# Patient Record
Sex: FEMALE | HISPANIC OR LATINO | Employment: FULL TIME | URBAN - METROPOLITAN AREA
[De-identification: names, ages, dates, MRNs, and addresses within clinical notes are randomized per-mention and may not be internally consistent; named-entity substitution may affect disease eponyms.]

---

## 2017-01-26 ENCOUNTER — TELEPHONE (OUTPATIENT)
Dept: OBSTETRICS AND GYNECOLOGY | Facility: CLINIC | Age: 22
End: 2017-01-26

## 2017-01-26 NOTE — TELEPHONE ENCOUNTER
Advised the patient that she needs to avoid any stimulation of the nipple or breast.  She should also wear a tight fitting bra or sports bra.  She may use cold packs or cold compresses to the breast as well.  She may also try over-the-counter Sudafed twice a day.    ----- Message from Deann Bernal sent at 1/26/2017  1:26 PM EST -----  Contact: PATIENT  Pt called for recommendations on how to dry up her breast milk.  Baby is 1 year old and is now on regular milk.    Pt # [phone]

## 2017-04-07 ENCOUNTER — OFFICE VISIT (OUTPATIENT)
Dept: OBSTETRICS AND GYNECOLOGY | Facility: CLINIC | Age: 22
End: 2017-04-07

## 2017-04-07 VITALS
HEIGHT: 63 IN | SYSTOLIC BLOOD PRESSURE: 108 MMHG | WEIGHT: 193 LBS | DIASTOLIC BLOOD PRESSURE: 68 MMHG | BODY MASS INDEX: 34.2 KG/M2 | HEART RATE: 88 BPM

## 2017-04-07 DIAGNOSIS — Z30.016 ENCOUNTER FOR INITIAL PRESCRIPTION OF TRANSDERMAL PATCH HORMONAL CONTRACEPTIVE DEVICE: ICD-10-CM

## 2017-04-07 DIAGNOSIS — Z30.9 ENCOUNTER FOR CONTRACEPTIVE MANAGEMENT, UNSPECIFIED CONTRACEPTIVE ENCOUNTER TYPE: Primary | ICD-10-CM

## 2017-04-07 DIAGNOSIS — Z30.09 BIRTH CONTROL COUNSELING: ICD-10-CM

## 2017-04-07 LAB
B-HCG UR QL: NEGATIVE
INTERNAL NEGATIVE CONTROL: NEGATIVE
INTERNAL POSITIVE CONTROL: POSITIVE
Lab: NORMAL

## 2017-04-07 PROCEDURE — 81025 URINE PREGNANCY TEST: CPT | Performed by: OBSTETRICS & GYNECOLOGY

## 2017-04-07 PROCEDURE — 99214 OFFICE O/P EST MOD 30 MIN: CPT | Performed by: OBSTETRICS & GYNECOLOGY

## 2017-04-07 NOTE — PROGRESS NOTES
"Subjective   Ashley Pratt is a 21 y.o. female   CC: Wants to discuss birth control  History of Present Illness  Patient is here to discuss starting on birth control.  She is currently using condoms as her method of contraception.  Additionally, she occasionally has taken a Plan B over-the-counter pill.  She is sexually active with 1 partner.  They use condoms on most every time.  She reports that typically her periods are regular.  Her most recent menses began 4 days ago.  She reports that it was lighter than usual.  It is only lasted 3 days.  Typically her periods last 3-7 days.  She does note moderate to severe cramping during her menses.  Her flow is moderate.  She has never been on birth control in the past.  She declines STD testing today.  She did take a home urine pregnancy test which was negative.  The patient is concerned about the risks of weight gain with birth control.  She is also worried about side effects of possible acne with certain types of birth control.  Patient also does not think that she will remember to take a pill every day.    The following portions of the patient's history were reviewed and updated as appropriate: allergies, current medications, past family history, past medical history, past social history, past surgical history and problem list.    Review of Systems  General: No fever or chills  Constitutional: No weight loss or gain, no hair loss  HENT: No headache, no hearing loss, no tinnitus  Eyes: normal vision, no eye pain  Lungs: No cough, no shortness of breath  Heart: No chest pain, no palpitations  Abdomen: No nausea, vomiting, constipation or diarrhea  : No dysuria, no hematuria  Skin: No rashes  Lymph: No swelling  Neuro: No parathesia, no weakness  Psych: Normal though content, no hallucinations, no SI/HI    Objective   Physical Exam  Vitals:    04/07/17 0833   BP: 108/68   Pulse: 88   Weight: 193 lb (87.5 kg)   Height: 63\" (160 cm)   LMP: 4/3/17  Gen: No acute distress, " awake and oriented times three  Pelvic:  Deferred  Psych: Good judgement and insight, normal affect and mood    Labs: Office UPT neg    Assessment/Plan   Diagnoses and all orders for this visit:    Encounter for contraceptive management, unspecified contraceptive encounter type  -     POC Pregnancy, Urine  -     norelgestromin-ethinyl estradiol (ORTHO EVRA) 150-35 MCG/24HR; Place 1 patch on the skin 1 (One) Time Per Week.    Birth control counseling  -     norelgestromin-ethinyl estradiol (ORTHO EVRA) 150-35 MCG/24HR; Place 1 patch on the skin 1 (One) Time Per Week.    Encounter for initial prescription of transdermal patch hormonal contraceptive device    We have discussed the full gambit of contraceptive options at length today.  We discussed abstinence, natural family planning, condoms, Plan B, birth control pills, patches, NuvaRing, injectable birth control, long-acting reversible contraception, and nonhormonal IUDs.  The risks, benefits, alternatives, and side effects of all these methods were discussed at length.  The patient believes of the birth control choice which will best fit her needs and minimize side effects she is most concerned about such as irregular bleeding, weight gain, and acne would be the birth control patches.  Instructions for use the patches were given to the patient.  She can start the patch this Sunday, since she has just had her menstrual cycle.  Office urine pregnancy test is negative today.  We discussed typical side effects that happened in the first 1-2 months with the patch.  If the patient is happy with the patch, she may continue it as her method.  I would plan to see the patient back in one year for annual exam.  If she is having problems with the patch and wishes to discuss other options, she may return to my office sooner.    I spent 22 out of 25 minutes with the patient in face to face counseling of the above issues.

## 2017-04-18 ENCOUNTER — TELEPHONE (OUTPATIENT)
Dept: OBSTETRICS AND GYNECOLOGY | Facility: CLINIC | Age: 22
End: 2017-04-18

## 2017-04-18 DIAGNOSIS — Z30.011 ENCOUNTER FOR INITIAL PRESCRIPTION OF CONTRACEPTIVE PILLS: Primary | ICD-10-CM

## 2017-04-18 PROBLEM — Z30.016 ENCOUNTER FOR INITIAL PRESCRIPTION OF TRANSDERMAL PATCH HORMONAL CONTRACEPTIVE DEVICE: Status: RESOLVED | Noted: 2017-04-07 | Resolved: 2017-04-18

## 2017-04-18 RX ORDER — NORGESTIMATE AND ETHINYL ESTRADIOL 0.25-0.035
1 KIT ORAL DAILY
Qty: 28 TABLET | Refills: 11 | Status: SHIPPED | OUTPATIENT
Start: 2017-04-18

## 2017-04-18 NOTE — TELEPHONE ENCOUNTER
I have sent in a prescription for birth control pills.  If she has been using the patch until now, she may take off the patch tomorrow and start her pills tomorrow.  If she has not been using the patch, she should wait to start the pills until after her next menses on the first Sunday after her period starts.    ----- Message from Annie Roland sent at 4/18/2017 10:46 AM EDT -----  PT IS WANTING TO KNOW IF SHE CAN SWITCH BIRTH CONTROL, PT WANTING TO KNOW IF SHE CAN START TAKING THE PILL OR WOULD SHE NEED AN APPT BEFORE SHE COULD SWITCH. PLEASE ADVISE

## 2017-05-30 ENCOUNTER — TELEPHONE (OUTPATIENT)
Dept: OBSTETRICS AND GYNECOLOGY | Facility: CLINIC | Age: 22
End: 2017-05-30

## 2017-05-31 ENCOUNTER — TELEPHONE (OUTPATIENT)
Dept: OBSTETRICS AND GYNECOLOGY | Facility: CLINIC | Age: 22
End: 2017-05-31

## 2017-06-01 NOTE — TELEPHONE ENCOUNTER
Janell    Tell her that while it is not the optimal situation to mis the final pill, she is unlikely to become pregnant as a result of this.  If she wants to be sure that she does not become pregnant, she could use condoms or abstain thru next pack of pills.    Thanks    Elizabeth    ----- Message from Deann Bernal sent at 5/26/2017  3:50 PM EDT -----  Contact: Pt  Pt called to report that she was suppose to take the last bc pill before her sugar pills, today.  However, she lost the tray before she could take the pill.  Please advise.    Pt # [phone]  Ok to leave message

## 2017-06-13 ENCOUNTER — TELEPHONE (OUTPATIENT)
Dept: OBSTETRICS AND GYNECOLOGY | Facility: CLINIC | Age: 22
End: 2017-06-13

## 2017-06-13 NOTE — TELEPHONE ENCOUNTER
She will need to make an appointment to be seen in the office.    ----- Message from Cynthia Reyer sent at 6/13/2017 10:32 AM EDT -----  Patient says she has had weight gain, mood swings and vaginal infections ever since she started the birth control pills. She is asking to switch back to the patches. RX # in chart, call back # [phone]

## 2017-07-06 ENCOUNTER — TELEPHONE (OUTPATIENT)
Dept: OBSTETRICS AND GYNECOLOGY | Facility: CLINIC | Age: 22
End: 2017-07-06

## 2017-07-06 NOTE — TELEPHONE ENCOUNTER
Pt called and stated she did not keep appointment because her PCP gave her the patch.  Pt advised of no-show policy.[email or web address]

## 2024-11-21 ENCOUNTER — OFFICE VISIT (OUTPATIENT)
Dept: FAMILY MEDICINE CLINIC | Facility: CLINIC | Age: 29
End: 2024-11-21
Payer: COMMERCIAL

## 2024-11-21 VITALS
SYSTOLIC BLOOD PRESSURE: 108 MMHG | OXYGEN SATURATION: 97 % | BODY MASS INDEX: 35.67 KG/M2 | DIASTOLIC BLOOD PRESSURE: 64 MMHG | HEART RATE: 76 BPM | HEIGHT: 63 IN | WEIGHT: 201.3 LBS

## 2024-11-21 DIAGNOSIS — B35.1 ONYCHOMYCOSIS: ICD-10-CM

## 2024-11-21 DIAGNOSIS — L20.84 INTRINSIC ECZEMA: Primary | ICD-10-CM

## 2024-11-21 PROCEDURE — 99204 OFFICE O/P NEW MOD 45 MIN: CPT | Performed by: NURSE PRACTITIONER

## 2024-11-21 RX ORDER — PRENATAL VIT 91/IRON/FOLIC/DHA 28-975-200
1 COMBINATION PACKAGE (EA) ORAL 2 TIMES DAILY
Qty: 42 G | Refills: 5 | Status: SHIPPED | OUTPATIENT
Start: 2024-11-21

## 2024-11-21 RX ORDER — DIAPER,BRIEF,INFANT-TODD,DISP
1 EACH MISCELLANEOUS 2 TIMES DAILY
Qty: 56 G | Refills: 5 | Status: SHIPPED | OUTPATIENT
Start: 2024-11-21

## 2024-11-21 RX ORDER — MINERAL OIL/HYDROPHIL PETROLAT
1 OINTMENT (GRAM) TOPICAL AS NEEDED
Qty: 198 G | Refills: 5 | Status: SHIPPED | OUTPATIENT
Start: 2024-11-21

## 2024-11-21 NOTE — PROGRESS NOTES
"Subjective     Ashley Pratt is a 29 y.o. female. Presents today for   Chief Complaint   Patient presents with    Nail Problem     Middle toe rt foot    Eczema       New patient here to establish care and receive an annual physical.  In addition she has nail fungus on her right third toe and eczema that is flaring.    History of Present Illness  The patient presents for evaluation of eczema.    She also has onychomycosis on her third toe on the right foot from having acrylics placed.    She is experiencing eczema on her foot and right hand. She reports no swelling in her legs or feet, and denies any chest pain or shortness of breath. She is currently breastfeeding.         Patient Active Problem List   Diagnosis    Birth control counseling    Encounter for initial prescription of contraceptive pills     History reviewed. No pertinent past medical history.  Past Surgical History:   Procedure Laterality Date    WISDOM TOOTH EXTRACTION       Family History   Problem Relation Age of Onset    No Known Problems Mother     No Known Problems Father      Social History     Socioeconomic History    Marital status:    Tobacco Use    Smoking status: Never    Smokeless tobacco: Never   Vaping Use    Vaping status: Never Used   Substance and Sexual Activity    Alcohol use: No    Drug use: No    Sexual activity: Yes     Birth control/protection: None       No Known Allergies    Current Outpatient Medications on File Prior to Visit   Medication Sig Dispense Refill    [DISCONTINUED] norgestimate-ethinyl estradiol (SPRINTEC 28) 0.25-35 MG-MCG per tablet Take 1 tablet by mouth Daily. 28 tablet 11     No current facility-administered medications on file prior to visit.       Objective   Vitals:    11/21/24 1111   BP: 108/64   Pulse: 76   SpO2: 97%   Weight: 91.3 kg (201 lb 4.8 oz)   Height: 160 cm (63\")     Body mass index is 35.66 kg/m².  Physical Exam  Constitutional:       Appearance: Normal appearance.   HENT:      Head: " Normocephalic and atraumatic.      Nose: Nose normal.      Mouth/Throat:      Mouth: Mucous membranes are moist.   Eyes:      Pupils: Pupils are equal, round, and reactive to light.   Cardiovascular:      Rate and Rhythm: Normal rate and regular rhythm.      Pulses: Normal pulses.      Heart sounds: Normal heart sounds.   Pulmonary:      Effort: Pulmonary effort is normal.      Breath sounds: Normal breath sounds.   Abdominal:      General: Bowel sounds are normal.   Musculoskeletal:         General: Normal range of motion.      Cervical back: Normal range of motion.   Skin:     General: Skin is warm and dry.      Capillary Refill: Capillary refill takes less than 2 seconds.   Neurological:      General: No focal deficit present.      Mental Status: She is alert.      Comments: CN II-XII grossly intact on exam AEB following finger with eyes, puffing cheeks, sticking out tongue, wiggling tongue, raising eyebrows, and shrugging shoulders.   Patient has equal push pull of upper and lower extremities.     Psychiatric:         Mood and Affect: Mood normal.       Physical Exam      Results         Procedures     Assessment & Plan   There are no diagnoses linked to this encounter.     Assessment & Plan  1. Eczema.  Hydrocortisone was prescribed for the eczema, with instructions to apply Aquaphor over it. She was advised to limit the use of hot pads and to take cool showers instead of hot ones to prevent further drying of the skin.    2. Fungal infection.  A prescription for topical terbinafine was provided as she is currently breastfeeding, and the topical form will not go systemic and affect the baby.    Diagnoses and all orders for this visit:    1. Intrinsic eczema (Primary)  -     hydrocortisone 1 % ointment; Apply 1 Application topically to the appropriate area as directed 2 (Two) Times a Day.  Dispense: 56 g; Refill: 5  -     mineral oil-hydrophilic petrolatum (AQUAPHOR) ointment; Apply 1 Application topically to the  appropriate area as directed As Needed for Dry Skin.  Dispense: 198 g; Refill: 5    2. Onychomycosis  -     terbinafine (lamISIL) 1 % cream; Apply 1 Application topically to the appropriate area as directed 2 (Two) Times a Day.  Dispense: 42 g; Refill: 5            Class 2 Severe Obesity (BMI >=35 and <=39.9). Obesity-related health conditions include the following: none. Obesity is unchanged. BMI is  ongoing . We discussed low calorie, low carb based diet program, portion control, increasing exercise, and joining a fitness center or start home based exercise program.     No follow-ups on file.     Patient or patient representative verbalized consent for the use of Ambient Listening during the visit with  HUEY Carlos for chart documentation. 11/21/2024  12:40 EST       MDM:  Patient is breastfeeding - chose topical until she is not breastfeeding for onychomycosis.  Eczema treated conservatively.

## 2025-01-23 NOTE — PROGRESS NOTES
Subjective   Ashley Pratt is a 29 y.o. female. Presents today for   Chief Complaint   Patient presents with   • Cough     Not feeling well    Has several members of family ill   Flu, Bronchitis and Pneumonia       History Of Present Illness Ashley presents for Covid and flu testing this am.    History of Present Illness  The patient presents for evaluation of a sore throat, ear pain, and cough.    She reports experiencing a sore throat, which has been present since the previous Monday. She has no associated fever.    Additionally, she describes auditory impairment in one ear, accompanied by pain that started yesterday. The pain intensifies during swallowing, spitting, or drinking. She has a history of ear infections.    She also reports a cough, which does not disrupt her sleep. Her mother administered 2 Mucinex pills, which provided some relief.    She has previously developed a yeast infection following antibiotic therapy.    Supplemental Information  She is currently seeking a new OB/GYN provider for her birth control needs.    FAMILY HISTORY  Her mother tested positive for pneumonia a couple of days ago, and her father was diagnosed with bronchitis.    ALLERGIES  The patient has no known allergies.    MEDICATIONS  Mucinex.    Patient Active Problem List   Diagnosis   • Birth control counseling   • Encounter for initial prescription of contraceptive pills       Social History     Socioeconomic History   • Marital status:    Tobacco Use   • Smoking status: Never   • Smokeless tobacco: Never   Vaping Use   • Vaping status: Never Used   Substance and Sexual Activity   • Alcohol use: No   • Drug use: No   • Sexual activity: Yes     Birth control/protection: None       No Known Allergies    Current Outpatient Medications on File Prior to Visit   Medication Sig Dispense Refill   • hydrocortisone 1 % ointment Apply 1 Application topically to the appropriate area as directed 2 (Two) Times a Day. 56 g 5   • mineral  "oil-hydrophilic petrolatum (AQUAPHOR) ointment Apply 1 Application topically to the appropriate area as directed As Needed for Dry Skin. 198 g 5   • terbinafine (lamISIL) 1 % cream Apply 1 Application topically to the appropriate area as directed 2 (Two) Times a Day. 42 g 5     No current facility-administered medications on file prior to visit.       Objective   Vitals:    01/24/25 0826   BP: 114/68   Temp: 98.3 °F (36.8 °C)   Weight: 90.9 kg (200 lb 6.4 oz)   Height: 160 cm (63\")     Body mass index is 35.5 kg/m².    Physical Exam    Physical Exam  Constitutional:       Appearance: Normal appearance.   HENT:      Head: Normocephalic and atraumatic.      Right Ear: Tympanic membrane, ear canal and external ear normal.      Left Ear: External ear normal.      Ears:      Comments: TM bulging with purulence behind the TM canal erythemic  Cardiovascular:      Rate and Rhythm: Normal rate and regular rhythm.      Pulses: Normal pulses.      Heart sounds: Normal heart sounds.   Pulmonary:      Effort: Pulmonary effort is normal.      Breath sounds: Normal breath sounds.   Musculoskeletal:         General: Normal range of motion.      Cervical back: Normal range of motion.   Skin:     General: Skin is warm and dry.      Capillary Refill: Capillary refill takes less than 2 seconds.   Neurological:      General: No focal deficit present.      Mental Status: She is alert.   Psychiatric:         Mood and Affect: Mood normal.     Results  Laboratory Studies  All tests were negative.  Covid and Flu negative       Procedures     Assessment & Plan   There are no diagnoses linked to this encounter.     Assessment & Plan  1. Pharyngitis.  The patient reports a sore throat and difficulty hearing in one ear, with pain noted yesterday. There is no fever. Given the symptoms and the recent exposure to family members with respiratory infections, an antibiotic regimen is warranted. A prescription for an antibiotic will be sent to " [facility]. She is advised to take 1 pill before starting the antibiotic and another after completing the course to prevent a yeast infection, as she has a history of developing yeast infections after antibiotic use.    2. Otalgia.  The patient reports ear pain and reduced hearing in one ear, which started yesterday. She has a history of ear infections. The ear pain is associated with swallowing. An antibiotic regimen will be initiated to address the potential infection.    3. Cough.  The patient has a mild cough that does not keep her awake. She reports some improvement after taking Mucinex. She is advised to continue using Mucinex as needed for cough relief.    4. Yeast infection prevention.  The patient has a history of developing yeast infections after taking antibiotics. To prevent this, she is advised to take 1 pill before starting the antibiotic and another after completing the course. The prescription will be sent to [facility].    Body mass index is 35.5 kg/m².    Discussed Care Gaps, ordered referrals and encouraged vaccination updates.       - Pt agrees with plan of care and denies further questions/concerns today  - This document is intended for medical expert use only. Persons  reading this document without medical staff guidance may result in misinterpretation and unintended morbidity     Go to the ER for any possible life-threatening symptoms such as chest pain or shortness of air.      Please allow 3-5 business days for recommendations based on new results      I personally spent time with this patient, preparing for the visit, reviewing tests, obtaining and/or reviewing a separately obtained history, performing a medically appropriate examination and/or evaluation, counseling and educating the patient/family/caregiver, ordering medications,  documenting information in the medical record and indepentently interpreting results.                    No follow-ups on file.     Patient or patient  representative verbalized consent for the use of Ambient Listening during the visit with  HUEY Carlos for chart documentation. 1/24/2025  08:55 EST

## 2025-01-24 ENCOUNTER — OFFICE VISIT (OUTPATIENT)
Dept: FAMILY MEDICINE CLINIC | Facility: CLINIC | Age: 30
End: 2025-01-24
Payer: COMMERCIAL

## 2025-01-24 VITALS
SYSTOLIC BLOOD PRESSURE: 114 MMHG | TEMPERATURE: 98.3 F | BODY MASS INDEX: 35.51 KG/M2 | HEIGHT: 63 IN | DIASTOLIC BLOOD PRESSURE: 68 MMHG | WEIGHT: 200.4 LBS

## 2025-01-24 DIAGNOSIS — T36.95XA ANTIBIOTIC-INDUCED YEAST INFECTION: ICD-10-CM

## 2025-01-24 DIAGNOSIS — R05.1 ACUTE COUGH: ICD-10-CM

## 2025-01-24 DIAGNOSIS — H65.192 ACUTE MUCOID OTITIS MEDIA OF LEFT EAR: Primary | ICD-10-CM

## 2025-01-24 DIAGNOSIS — B37.9 ANTIBIOTIC-INDUCED YEAST INFECTION: ICD-10-CM

## 2025-01-24 LAB
EXPIRATION DATE: NORMAL
FLUAV AG UPPER RESP QL IA.RAPID: NOT DETECTED
FLUBV AG UPPER RESP QL IA.RAPID: NOT DETECTED
INTERNAL CONTROL: NORMAL
Lab: NORMAL
SARS-COV-2 AG UPPER RESP QL IA.RAPID: NOT DETECTED

## 2025-01-24 PROCEDURE — 87428 SARSCOV & INF VIR A&B AG IA: CPT | Performed by: NURSE PRACTITIONER

## 2025-01-24 PROCEDURE — 99213 OFFICE O/P EST LOW 20 MIN: CPT | Performed by: NURSE PRACTITIONER

## 2025-01-24 PROCEDURE — 1159F MED LIST DOCD IN RCRD: CPT | Performed by: NURSE PRACTITIONER

## 2025-01-24 PROCEDURE — 1160F RVW MEDS BY RX/DR IN RCRD: CPT | Performed by: NURSE PRACTITIONER

## 2025-01-24 RX ORDER — AMOXICILLIN 500 MG/1
1000 CAPSULE ORAL 2 TIMES DAILY
Qty: 14 CAPSULE | Refills: 0 | Status: SHIPPED | OUTPATIENT
Start: 2025-01-24

## 2025-01-24 RX ORDER — FLUCONAZOLE 150 MG/1
150 TABLET ORAL ONCE
Qty: 2 TABLET | Refills: 0 | Status: SHIPPED | OUTPATIENT
Start: 2025-01-24 | End: 2025-01-24